# Patient Record
Sex: FEMALE | Race: BLACK OR AFRICAN AMERICAN | NOT HISPANIC OR LATINO
[De-identification: names, ages, dates, MRNs, and addresses within clinical notes are randomized per-mention and may not be internally consistent; named-entity substitution may affect disease eponyms.]

---

## 2019-09-16 VITALS
SYSTOLIC BLOOD PRESSURE: 133 MMHG | HEART RATE: 72 BPM | WEIGHT: 223.11 LBS | TEMPERATURE: 98 F | OXYGEN SATURATION: 98 % | HEIGHT: 64 IN | DIASTOLIC BLOOD PRESSURE: 88 MMHG | RESPIRATION RATE: 18 BRPM

## 2019-09-16 PROCEDURE — 71045 X-RAY EXAM CHEST 1 VIEW: CPT | Mod: 26

## 2019-09-16 RX ORDER — LIDOCAINE 4 G/100G
1 CREAM TOPICAL ONCE
Refills: 0 | Status: COMPLETED | OUTPATIENT
Start: 2019-09-16 | End: 2019-09-16

## 2019-09-16 NOTE — ED PROVIDER NOTE - PROGRESS NOTE DETAILS
pt states she slipped and fell in restroom c/o of left patellar and knee pain-  no ankle tenderness or deformity noted 2+/  DP /PT pulses palpated CR < 2 sec

## 2019-09-16 NOTE — ED ADULT NURSE NOTE - NSIMPLEMENTINTERV_GEN_ALL_ED
Implemented All Universal Safety Interventions:  [place] to call system. Call bell, personal items and telephone within reach. Instruct patient to call for assistance. Room bathroom lighting operational. Non-slip footwear when patient is off stretcher. Physically safe environment: no spills, clutter or unnecessary equipment. Stretcher in lowest position, wheels locked, appropriate side rails in place.

## 2019-09-16 NOTE — ED ADULT NURSE NOTE - OBJECTIVE STATEMENT
Pt presents complaining of chest pain, palpitations, and lightheadedness occurring suddenly this evening. Pt reports she has had 4 episodes of palpitations over the Pt presents complaining of chest pain, palpitations, and lightheadedness occurring suddenly this evening. Pt reports she has had 4 episodes of palpitations over the course of the past month, states she was worked up at a hospital in [place] last week and discharged without a diagnosis. Pt reports taking 3 baby aspirin pta, states after taking the aspirin all of her symptoms resolved. Pt reports hx of PE in 2011, currently taking lovenox.

## 2019-09-16 NOTE — ED PROVIDER NOTE - CLINICAL SUMMARY MEDICAL DECISION MAKING FREE TEXT BOX
49 yo F ex smoker x 2 years quit 2 years ago hx of PE  2011- on Lovenox  x 8 years 150 mg a day pain- no exertional CP  but failed a stress test in 2011 per pt  hx - no ischemic changes  pt "adopted"  but no fam hx of CAD or early MI  that she is aware of 47 yo F ex smoker x 2 years quit 2 years ago hx of PE  2011- on Lovenox  x 8 years 150 mg a day pain- no exertional CP  but failed a stress test in 2011 per pt  hx - no ischemic changes  pt "adopted"  but no fam hx of CAD or early MI  that she is aware of  failed a stress test 2 years ago  rec if CTA  neg to admit under cards - dr baptiste  dw  fellow 49 yo F ex smoker x 2 years quit 2 years ago hx of PE  2011- on Lovenox  x 8 years 150 mg a day pain- no exertional CP  but failed a stress test in 2 years ago  - no ischemic changes on EKG-  pt "adopted"  but no fam hx of CAD or early MI -  rec if CTA  neg to admit under cards - dr baptiste  dw  fellow

## 2019-09-16 NOTE — ED ADULT NURSE NOTE - CHPI ED NUR SYMPTOMS NEG
no shortness of breath/no nausea/no diaphoresis/no chills/no vomiting/no fever/no back pain/no syncope/no congestion

## 2019-09-16 NOTE — ED PROVIDER NOTE - DIAGNOSTIC INTERPRETATION
Xray left knee-3 views - no fx/  no effusion    Xray left tibia fib 2 views   no fx  noted     CXR- nl bones and soft tissues - nl mediastinum-  no ptx - no consolidation

## 2019-09-16 NOTE — ED PROVIDER NOTE - CARE PLAN
Principal Discharge DX:	Palpitations  Secondary Diagnosis:	Chest pain Principal Discharge DX:	Palpitations  Secondary Diagnosis:	Chest pain  Secondary Diagnosis:	Fall  Secondary Diagnosis:	Knee contusion

## 2019-09-16 NOTE — ED ADULT NURSE REASSESSMENT NOTE - NS ED NURSE REASSESS COMMENT FT1
Pt fell while using bathroom. Pt states "there was water on the floor and I slipped in the puddle." Pt elicits tenderness to her left patella. Pt endorses no lightheadedness, no dizziness at the time of the fall. Pt reports no head impact. MD Marie made aware, LONA Marshall made aware, pt given ice pack for comfort.

## 2019-09-16 NOTE — ED PROVIDER NOTE - SHIFT CHANGE DETAILS
await CTA to eval for PE -- if neg for large PE, rec admit cards -- cards fellow aware [facility] well

## 2019-09-16 NOTE — ED PROVIDER NOTE - OBJECTIVE STATEMENT
47 yo F with hx of PE 2011-  on Lovenox-  with palpitations over the past 6-8 hrs - no cough or fevers or chills -  no calf tenderness or leg swelling or edema no hx of DVT or PE ex smoker 1 ppd  quit 2 years ago  no hx of DVT /PE 47 yo F with hx of PE 2011-  on Lovenox-  with palpitations over the past 6-8 hrs - no cough or fevers or chills -  no calf tenderness or leg swelling or edema no hx of DVT or PE ex smoker 1 ppd  quit 2 years ago  no hx of DVT /PE  c/op of palpitations and tight ness in chest x 2 days - non exertional  in nature   states she had a stress test a few years ago that se apparently did not " pass"  denies any exertional CP 47 yo F with hx of PE 2011-  on Lovenox-  with palpitations over the past 6-8 hrs - no cough or fevers or chills -  no calf tenderness or leg swelling or edema no hx of DVT or PE ex smoker 1 ppd  quit 2 years ago  no hx of DVT /PE  c/op of palpitations and tightness in chest x 2 days  non exertional  in nature   states she had a stress test a few years ago that he apparently did not " pass" - denies any obvious  exertional CP 47 yo F with hx of PE 2011-  on Lovenox-  with palpitations over the past 6-8 hrs - no cough or fevers or chills - no calf tenderness or leg swelling or edema -no hx of DVT-  ex smoker 1 ppd  quit 2 years ago -also c/o of palpitations and tightness in chest x 2 days-  non exertional  in nature -  states she had a stress test a few years ago that he apparently did not " pass" - denies any obvious  exertional CP

## 2019-09-17 ENCOUNTER — TRANSCRIPTION ENCOUNTER (OUTPATIENT)
Age: 49
End: 2019-09-17

## 2019-09-17 ENCOUNTER — INPATIENT (INPATIENT)
Facility: HOSPITAL | Age: 49
LOS: 0 days | Discharge: HOME | DRG: 313 | End: 2019-09-17
Attending: INTERNAL MEDICINE | Admitting: INTERNAL MEDICINE
Payer: MEDICARE

## 2019-09-17 VITALS
DIASTOLIC BLOOD PRESSURE: 90 MMHG | HEART RATE: 58 BPM | RESPIRATION RATE: 18 BRPM | OXYGEN SATURATION: 95 % | SYSTOLIC BLOOD PRESSURE: 133 MMHG

## 2019-09-17 DIAGNOSIS — S80.00XA CONTUSION OF UNSPECIFIED KNEE, INITIAL ENCOUNTER: ICD-10-CM

## 2019-09-17 DIAGNOSIS — I26.99 OTHER PULMONARY EMBOLISM WITHOUT ACUTE COR PULMONALE: ICD-10-CM

## 2019-09-17 DIAGNOSIS — R07.9 CHEST PAIN, UNSPECIFIED: ICD-10-CM

## 2019-09-17 DIAGNOSIS — E11.9 TYPE 2 DIABETES MELLITUS WITHOUT COMPLICATIONS: ICD-10-CM

## 2019-09-17 DIAGNOSIS — Z98.891 HISTORY OF UTERINE SCAR FROM PREVIOUS SURGERY: Chronic | ICD-10-CM

## 2019-09-17 LAB
ANION GAP SERPL CALC-SCNC: 11 MMOL/L — SIGNIFICANT CHANGE UP (ref 5–17)
BUN SERPL-MCNC: 11 MG/DL — SIGNIFICANT CHANGE UP (ref 7–23)
CALCIUM SERPL-MCNC: 8.7 MG/DL — SIGNIFICANT CHANGE UP (ref 8.4–10.5)
CHLORIDE SERPL-SCNC: 108 MMOL/L — SIGNIFICANT CHANGE UP (ref 96–108)
CO2 SERPL-SCNC: 24 MMOL/L — SIGNIFICANT CHANGE UP (ref 22–31)
CREAT SERPL-MCNC: 0.98 MG/DL — SIGNIFICANT CHANGE UP (ref 0.5–1.3)
D DIMER BLD IA.RAPID-MCNC: 302 NG/ML DDU — HIGH
EXTRA BLUE TOP TUBE: SIGNIFICANT CHANGE UP
EXTRA SST TUBE: SIGNIFICANT CHANGE UP
EXTRA SST TUBE: SIGNIFICANT CHANGE UP
GLUCOSE BLDC GLUCOMTR-MCNC: 100 MG/DL — HIGH (ref 70–99)
GLUCOSE BLDC GLUCOMTR-MCNC: 94 MG/DL — SIGNIFICANT CHANGE UP (ref 70–99)
GLUCOSE BLDC GLUCOMTR-MCNC: 98 MG/DL — SIGNIFICANT CHANGE UP (ref 70–99)
GLUCOSE SERPL-MCNC: 105 MG/DL — HIGH (ref 70–99)
HCT VFR BLD CALC: 41.1 % — SIGNIFICANT CHANGE UP (ref 34.5–45)
HGB BLD-MCNC: 13.2 G/DL — SIGNIFICANT CHANGE UP (ref 11.5–15.5)
MCHC RBC-ENTMCNC: 28.9 PG — SIGNIFICANT CHANGE UP (ref 27–34)
MCHC RBC-ENTMCNC: 32.1 GM/DL — SIGNIFICANT CHANGE UP (ref 32–36)
MCV RBC AUTO: 89.9 FL — SIGNIFICANT CHANGE UP (ref 80–100)
NRBC # BLD: 0 /100 WBCS — SIGNIFICANT CHANGE UP (ref 0–0)
PLATELET # BLD AUTO: 213 K/UL — SIGNIFICANT CHANGE UP (ref 150–400)
POTASSIUM SERPL-MCNC: 4 MMOL/L — SIGNIFICANT CHANGE UP (ref 3.5–5.3)
POTASSIUM SERPL-SCNC: 4 MMOL/L — SIGNIFICANT CHANGE UP (ref 3.5–5.3)
RBC # BLD: 4.57 M/UL — SIGNIFICANT CHANGE UP (ref 3.8–5.2)
RBC # FLD: 14.7 % — HIGH (ref 10.3–14.5)
SODIUM SERPL-SCNC: 143 MMOL/L — SIGNIFICANT CHANGE UP (ref 135–145)
TROPONIN T SERPL-MCNC: <0.01 NG/ML — SIGNIFICANT CHANGE UP (ref 0–0.01)
WBC # BLD: 4.65 K/UL — SIGNIFICANT CHANGE UP (ref 3.8–10.5)
WBC # FLD AUTO: 4.65 K/UL — SIGNIFICANT CHANGE UP (ref 3.8–10.5)

## 2019-09-17 PROCEDURE — 99285 EMERGENCY DEPT VISIT HI MDM: CPT

## 2019-09-17 PROCEDURE — 75574 CT ANGIO HRT W/3D IMAGE: CPT | Mod: 26

## 2019-09-17 PROCEDURE — 99283 EMERGENCY DEPT VISIT LOW MDM: CPT | Mod: 25

## 2019-09-17 PROCEDURE — 73590 X-RAY EXAM OF LOWER LEG: CPT | Mod: 26,LT

## 2019-09-17 PROCEDURE — 93306 TTE W/DOPPLER COMPLETE: CPT | Mod: 26

## 2019-09-17 PROCEDURE — 71275 CT ANGIOGRAPHY CHEST: CPT | Mod: 26

## 2019-09-17 PROCEDURE — 93970 EXTREMITY STUDY: CPT | Mod: 26

## 2019-09-17 PROCEDURE — 93010 ELECTROCARDIOGRAM REPORT: CPT

## 2019-09-17 PROCEDURE — 73562 X-RAY EXAM OF KNEE 3: CPT | Mod: 26,LT

## 2019-09-17 PROCEDURE — 78582 LUNG VENTILAT&PERFUS IMAGING: CPT | Mod: 26

## 2019-09-17 RX ORDER — OMEPRAZOLE 10 MG/1
1 CAPSULE, DELAYED RELEASE ORAL
Qty: 0 | Refills: 0 | DISCHARGE

## 2019-09-17 RX ORDER — CYCLOBENZAPRINE HYDROCHLORIDE 10 MG/1
0 TABLET, FILM COATED ORAL
Qty: 0 | Refills: 0 | DISCHARGE

## 2019-09-17 RX ORDER — GLUCAGON INJECTION, SOLUTION 0.5 MG/.1ML
1 INJECTION, SOLUTION SUBCUTANEOUS ONCE
Refills: 0 | Status: DISCONTINUED | OUTPATIENT
Start: 2019-09-17 | End: 2019-09-17

## 2019-09-17 RX ORDER — ENOXAPARIN SODIUM 100 MG/ML
150 INJECTION SUBCUTANEOUS EVERY 24 HOURS
Refills: 0 | Status: DISCONTINUED | OUTPATIENT
Start: 2019-09-17 | End: 2019-09-17

## 2019-09-17 RX ORDER — DEXTROSE 50 % IN WATER 50 %
25 SYRINGE (ML) INTRAVENOUS ONCE
Refills: 0 | Status: DISCONTINUED | OUTPATIENT
Start: 2019-09-17 | End: 2019-09-17

## 2019-09-17 RX ORDER — LISINOPRIL 2.5 MG/1
1 TABLET ORAL
Qty: 0 | Refills: 0 | DISCHARGE

## 2019-09-17 RX ORDER — DEXTROSE 50 % IN WATER 50 %
15 SYRINGE (ML) INTRAVENOUS ONCE
Refills: 0 | Status: DISCONTINUED | OUTPATIENT
Start: 2019-09-17 | End: 2019-09-17

## 2019-09-17 RX ORDER — PANTOPRAZOLE SODIUM 20 MG/1
40 TABLET, DELAYED RELEASE ORAL
Refills: 0 | Status: DISCONTINUED | OUTPATIENT
Start: 2019-09-17 | End: 2019-09-17

## 2019-09-17 RX ORDER — METFORMIN HYDROCHLORIDE 850 MG/1
1 TABLET ORAL
Qty: 0 | Refills: 0 | DISCHARGE

## 2019-09-17 RX ORDER — LISINOPRIL 2.5 MG/1
20 TABLET ORAL
Refills: 0 | Status: DISCONTINUED | OUTPATIENT
Start: 2019-09-17 | End: 2019-09-17

## 2019-09-17 RX ORDER — SODIUM CHLORIDE 9 MG/ML
1000 INJECTION, SOLUTION INTRAVENOUS
Refills: 0 | Status: DISCONTINUED | OUTPATIENT
Start: 2019-09-17 | End: 2019-09-17

## 2019-09-17 RX ORDER — INSULIN LISPRO 100/ML
VIAL (ML) SUBCUTANEOUS
Refills: 0 | Status: DISCONTINUED | OUTPATIENT
Start: 2019-09-17 | End: 2019-09-17

## 2019-09-17 RX ORDER — ACETAMINOPHEN 500 MG
650 TABLET ORAL ONCE
Refills: 0 | Status: COMPLETED | OUTPATIENT
Start: 2019-09-17 | End: 2019-09-17

## 2019-09-17 RX ORDER — ENOXAPARIN SODIUM 100 MG/ML
0 INJECTION SUBCUTANEOUS
Qty: 0 | Refills: 0 | DISCHARGE

## 2019-09-17 RX ORDER — DEXTROSE 50 % IN WATER 50 %
12.5 SYRINGE (ML) INTRAVENOUS ONCE
Refills: 0 | Status: DISCONTINUED | OUTPATIENT
Start: 2019-09-17 | End: 2019-09-17

## 2019-09-17 RX ORDER — METOPROLOL TARTRATE 50 MG
50 TABLET ORAL ONCE
Refills: 0 | Status: COMPLETED | OUTPATIENT
Start: 2019-09-17 | End: 2019-09-17

## 2019-09-17 RX ORDER — INSULIN LISPRO 100/ML
VIAL (ML) SUBCUTANEOUS AT BEDTIME
Refills: 0 | Status: DISCONTINUED | OUTPATIENT
Start: 2019-09-17 | End: 2019-09-17

## 2019-09-17 RX ADMIN — Medication 50 MILLIGRAM(S): at 14:00

## 2019-09-17 RX ADMIN — LIDOCAINE 1 PATCH: 4 CREAM TOPICAL at 07:57

## 2019-09-17 RX ADMIN — ENOXAPARIN SODIUM 150 MILLIGRAM(S): 100 INJECTION SUBCUTANEOUS at 14:01

## 2019-09-17 RX ADMIN — Medication 650 MILLIGRAM(S): at 18:41

## 2019-09-17 RX ADMIN — LIDOCAINE 1 PATCH: 4 CREAM TOPICAL at 00:20

## 2019-09-17 RX ADMIN — Medication 650 MILLIGRAM(S): at 18:36

## 2019-09-17 RX ADMIN — LIDOCAINE 1 PATCH: 4 CREAM TOPICAL at 14:51

## 2019-09-17 NOTE — DISCHARGE NOTE PROVIDER - HOSPITAL COURSE
47y/o obese female with PMHx of HTN, DM-II, b/l PE in 2011--on Lovenox, Asthma, who presented to the ER 9/17/19 with 2 days of intermittent mid-sternal non radiating crampy 5/10 chest pain which lasting a few minutes at a time. Chest pain is associated with SOB, nausea, 1 episode of vomiting, and palpitations. She was evaluated by her PMD day of admission and was found to have /110, HR 90. She was recommended to increase her Lisinopril from 20mg daily to q12 which she did, and came into the ED after this didn't relieve her sx. In the ER, /88, HR 72, R 18, T 98.3, O2 sat 98% RA. She fell in the bathroom and hurt her left knee, knee Xray negative for fracture, CTA has no large or central pulmonary embolism, unable to see small vessels.  She received lidocaine patch for her knee pain and admitted for further cardiac management. V/Q scan and Lower extremity US negative for PE and DVT respectively. CTA coronaries negative for any plaques, Ca score 0. VSS, labs reviewed and patient is stable for discharge as discussed with Dr. Whalen. Patient has been given appropriate discharge instructions, including medication regimen. Follow up with primary care or cardiologist within the next week. Any new medications have been sent via e-prescription to patient’s pharmacy.

## 2019-09-17 NOTE — H&P ADULT - HISTORY OF PRESENT ILLNESS
This is a 49y/o obese female with PMHx of HTN, DM-II, b/l PE in 2011--on Lovenox Asthma who presents to the ER with 2 days of on/off mid sternal, non radiating chest pain which is described as crampy, rated 5/10, lasting a few minutes and associated with SOB, nausea, 1 episode of vomiting, palpitations. She was evaluated by her PMD day of admission and was found to have /110, HR 90. She was recommended to increase her Lisinopril from 20mg daily to q12 which she did but no help with her symptoms so she came to the ER  In the ER, /88, HR 72, R 18, T 98.3, O2 sat 98% RA. She fell in the bathroom and hurt her left knee, knee Xray negative for fracture, CTA has no large or central pulmonary embolism. Lab significant for K 3.6, , MB 1.4, Troponin <0.01. She received lidocaine patch for her knee pain and admitted for further cardiac management

## 2019-09-17 NOTE — H&P ADULT - NSHPPHYSICALEXAM_GEN_ALL_CORE
Temp 98.6F, HR 70bpm, /80, RR 16, O2 sat 98% RA, Weight 200lbs, Height 5' 6"    T(C): 36.7 (09-17-19 @ 05:10), Max: 36.8 (09-16-19 @ 21:02)  HR: 71 (09-17-19 @ 05:10) (67 - 72)  BP: 107/73 (09-17-19 @ 05:10) (107/73 - 133/88)  RR: 18 (09-17-19 @ 05:10) (16 - 18)  SpO2: 99% (09-17-19 @ 05:10) (98% - 99%)  Wt(kg): --    Appearance: Normal	  HEENT:   Normal oral mucosa, PERRL, EOMI	  Neck: Supple, - JVD; No Carotid Bruit and 2+ pulses B/L  Cardiovascular: Normal S1 S2, No JVD, No murmurs  Respiratory: Lungs clear to auscultation, No Rales, Rhonchi, Wheezing	  Gastrointestinal:  Soft, Non-tender, + BS	  Skin: No rashes, No ecchymoses, No cyanosis  Extremities: Normal range of motion, No clubbing, cyanosis or edema  Vascular: Femoral pulses 2+ b/l without bruit, DP 1+ b/l, PT 1+ b/l  Neurologic: Non-focal  Psychiatry: A & O x 3, Mood & affect appropriate Temp 98.6F, HR 70bpm, /80, RR 16, O2 sat 98% RA, Weight 200lbs, Height 5' 6"    T(C): 36.7 (09-17-19 @ 05:10), Max: 36.8 (09-16-19 @ 21:02)  HR: 71 (09-17-19 @ 05:10) (67 - 72)  BP: 107/73 (09-17-19 @ 05:10) (107/73 - 133/88)  RR: 18 (09-17-19 @ 05:10) (16 - 18)  SpO2: 99% (09-17-19 @ 05:10) (98% - 99%)  Wt(kg): --    Appearance: Normal	  HEENT:   Normal oral mucosa, PERRL, EOMI	  Neck: Supple, - JVD; No Carotid Bruit and 2+ pulses B/L  Cardiovascular: Normal S1 S2, No JVD, No murmurs  Respiratory: Lungs clear to auscultation, No Rales, Rhonchi, Wheezing	  Gastrointestinal:  Soft, Non-tender, + BS	  Skin: No rashes, No ecchymoses, No cyanosis  Extremities: Normal range of motion, No clubbing, cyanosis or edema  Vascular: Femoral pulses 2+ b/l without bruit, DP 1+ b/l, PT 1+ b/l  Neurologic: Non-focal  Psychiatry: A & O x 3, Mood & affect appropriate  msk +from

## 2019-09-17 NOTE — DISCHARGE NOTE NURSING/CASE MANAGEMENT/SOCIAL WORK - NSDCPEWEB_GEN_ALL_CORE
[facility] for Tobacco Control website --- [email or web address] website --- [email or web address]

## 2019-09-17 NOTE — DISCHARGE NOTE NURSING/CASE MANAGEMENT/SOCIAL WORK - PATIENT PORTAL LINK FT
You can access the FollowMyHealth Patient Portal offered by [facility] by registering at the following website: [email or web address]. By joining [facility]’s FollowMyHealth portal, you will also be able to view your health information using other applications (apps) compatible with our system.

## 2019-09-17 NOTE — DISCHARGE NOTE PROVIDER - NSDCCPCAREPLAN_GEN_ALL_CORE_FT
PRINCIPAL DISCHARGE DIAGNOSIS  Diagnosis: Chest pain  Assessment and Plan of Treatment: You were admitted to rule out heart attack and PE as causes of chest pain. You had a CTA chest, CTA coronaries, negative blood tests, and VQ scans which showed that you did not have PE or heart attack. There are several causes of chest pain, including acid reflux (heartburn) and musculoskeletal. These other causes can be worked up outpatient. You can follow up with your primary care doctor for further workup of chest pain.      SECONDARY DISCHARGE DIAGNOSES  Diagnosis: Hypertension  Assessment and Plan of Treatment: Your blood pressure was high when you came into the hospital. Start a low salt diet (<2 g of sodium per day). Continue lisinopril 20 mg twice daily. Folllow up with your primary care doctor or cardiologist for further blood pressure workup and medication adjustment. PRINCIPAL DISCHARGE DIAGNOSIS  Diagnosis: Chest pain  Assessment and Plan of Treatment: You were admitted to rule out heart attack and PE as causes of chest pain. You had a CTA chest, CTA coronaries, negative blood tests, and VQ scans which showed that you did not have PE or heart attack. There are several causes of chest pain, including acid reflux (heartburn) and musculoskeletal. These other causes can be worked up outpatient. You can follow up with your primary care doctor for further workup of chest pain.      SECONDARY DISCHARGE DIAGNOSES  Diagnosis: Hypertension  Assessment and Plan of Treatment: Your blood pressure was high when you came into the hospital. Start a low salt diet (<2 g of sodium per day). Continue lisinopril 20 mg twice daily. Folllow up with your primary care doctor or cardiologist for further blood pressure workup and medication adjustment.    Diagnosis: Pre-diabetes  Assessment and Plan of Treatment: HOLD METFORMIN FOR TWO DAYS. You got IV contrast dye with your CT scans today and yesterday and this can interact with metformin and your kidneys. RESTART METFORMIN ON FRIDAY 9/20/19.

## 2019-09-17 NOTE — H&P ADULT - ATTENDING COMMENTS
Assessment: Patient personally seen and examined myself during rounds with the   Physician Assistant  ON DATE 9/17/19    Note read, including vitals, physical findings, laboratory data, and radiological reports.   Agree with above and revisions, if any, included below.  - New acs-unstable angina  - Severe chest pain  - Unstable hemodynamics  - Plan of Care: continuous telemetry monitoring for arrhythmias, echocardiogram to evaluate ejection fraction and central pressures, daily weights and I/Os, serial EKGs and lab work to evaluate and replete potassium, magnesium. Troponin added to labs and will cycle. Will need to be seen by EP or Interv Cardio if intervention needed.

## 2019-09-17 NOTE — H&P ADULT - NSHPLABSRESULTS_GEN_ALL_CORE
Labs                        13.9   6.98  )-----------( 246      ( 16 Sep 2019 21:42 )             43.4       09-16    142  |  106  |  12  ----------------------------<  138<H>  3.6   |  25  |  0.93    Ca    9.4      16 Sep 2019 21:42    TPro  7.8  /  Alb  4.2  /  TBili  0.3  /  DBili  x   /  AST  28  /  ALT  34  /  AlkPhos  58  09-16      PT/INR - ( 16 Sep 2019 21:42 )   PT: 11.1 sec;   INR: 0.98          PTT - ( 16 Sep 2019 21:42 )  PTT:32.4 sec    CARDIAC MARKERS ( 16 Sep 2019 21:42 )  x     / <0.01 ng/mL / 216 U/L / x     / 1.4 ng/mL

## 2019-09-17 NOTE — H&P ADULT - NSICDXPASTMEDICALHX_GEN_ALL_CORE_FT
PAST MEDICAL HISTORY:  Asthma     Diabetes     Hypertension     Pulmonary embolism     S/P hysterectomy

## 2019-09-17 NOTE — H&P ADULT - NSHPREVIEWOFSYSTEMS_GEN_ALL_CORE
GENERAL, CONSTITUTIONAL : denies recent weight loss, fever, chills  EYES, VISION: denies changes in vision   EARS, NOSE, THROAT: denies hearing loss  HEART, CARDIOVASCULAR: +chest pain, palpitations, SOB; denies arrhythmia, LE edema, claudication  RESPIRATORY: +SOB; Denies cough, wheezing, PND, orthopnea  GASTROINTESTINAL: Denies abdominal pain, heartburn, bloody stool, dark tarry stool  GENITOURINARY: Denies frequent urination, urgency  MUSCULOSKELETAL: +joint pain; denies swelling, restricted motion, musculoskeletal pain.   SKIN & INTEGUMENTARY Denies rashes, sores, blisters, blisters, growths.  NEUROLOGICAL: Denies numbness or tingling sensations, sensation loss, burning.   PSYCHIATRIC: Denies nervousness, anxiety, depression  ENDOCRINE Denies heat or cold intolerance, excessive thirst  HEMATOLOGIC/LYMPHATIC: Denies abnormal bleeding, bleeding of any kind

## 2019-09-17 NOTE — DISCHARGE NOTE NURSING/CASE MANAGEMENT/SOCIAL WORK - NSDCPEEMAIL_GEN_ALL_CORE
[facility] for Tobacco Control email [email or web address]

## 2019-09-17 NOTE — DISCHARGE NOTE PROVIDER - NSDCFUADDAPPT_GEN_ALL_CORE_FT
Follow up with primary care or cardiology in the next week. Follow up with primary care doctor, Dr. Flores in the next week.

## 2019-09-19 DIAGNOSIS — Z88.5 ALLERGY STATUS TO NARCOTIC AGENT: ICD-10-CM
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
## 2019-09-19 DIAGNOSIS — J45.909 UNSPECIFIED ASTHMA, UNCOMPLICATED: ICD-10-CM
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
## 2019-09-19 DIAGNOSIS — Z79.01 LONG TERM (CURRENT) USE OF ANTICOAGULANTS: ICD-10-CM

## 2019-09-19 DIAGNOSIS — R07.9 CHEST PAIN, UNSPECIFIED: ICD-10-CM

## 2019-09-19 DIAGNOSIS — Z87.891 PERSONAL HISTORY OF NICOTINE DEPENDENCE: ICD-10-CM

## 2019-09-19 DIAGNOSIS — I10 ESSENTIAL (PRIMARY) HYPERTENSION: ICD-10-CM

## 2019-09-19 DIAGNOSIS — Z79.84 LONG TERM (CURRENT) USE OF ORAL HYPOGLYCEMIC DRUGS: ICD-10-CM

## 2019-09-19 DIAGNOSIS — E11.9 TYPE 2 DIABETES MELLITUS WITHOUT COMPLICATIONS: ICD-10-CM

## 2019-09-19 DIAGNOSIS — S80.02XA CONTUSION OF LEFT KNEE, INITIAL ENCOUNTER: ICD-10-CM

## 2019-09-19 DIAGNOSIS — Z86.711 PERSONAL HISTORY OF PULMONARY EMBOLISM: ICD-10-CM

## 2019-10-13 PROCEDURE — 93005 ELECTROCARDIOGRAM TRACING: CPT

## 2019-10-13 PROCEDURE — 78582 LUNG VENTILAT&PERFUS IMAGING: CPT

## 2019-10-13 PROCEDURE — 71045 X-RAY EXAM CHEST 1 VIEW: CPT

## 2019-10-13 PROCEDURE — 84484 ASSAY OF TROPONIN QUANT: CPT

## 2019-10-13 PROCEDURE — 85025 COMPLETE CBC W/AUTO DIFF WBC: CPT

## 2019-10-13 PROCEDURE — 82962 GLUCOSE BLOOD TEST: CPT

## 2019-10-13 PROCEDURE — 82550 ASSAY OF CK (CPK): CPT

## 2019-10-13 PROCEDURE — 85610 PROTHROMBIN TIME: CPT

## 2019-10-13 PROCEDURE — 36415 COLL VENOUS BLD VENIPUNCTURE: CPT

## 2019-10-13 PROCEDURE — 93970 EXTREMITY STUDY: CPT

## 2019-10-13 PROCEDURE — 82553 CREATINE MB FRACTION: CPT

## 2019-10-13 PROCEDURE — 71275 CT ANGIOGRAPHY CHEST: CPT

## 2019-10-13 PROCEDURE — 80053 COMPREHEN METABOLIC PANEL: CPT

## 2019-10-13 PROCEDURE — 99285 EMERGENCY DEPT VISIT HI MDM: CPT | Mod: 25

## 2019-10-13 PROCEDURE — A9567: CPT

## 2019-10-13 PROCEDURE — 73590 X-RAY EXAM OF LOWER LEG: CPT

## 2019-10-13 PROCEDURE — 85379 FIBRIN DEGRADATION QUANT: CPT

## 2019-10-13 PROCEDURE — 73562 X-RAY EXAM OF KNEE 3: CPT

## 2019-10-13 PROCEDURE — A9540: CPT

## 2019-10-13 PROCEDURE — 85027 COMPLETE CBC AUTOMATED: CPT

## 2019-10-13 PROCEDURE — 85730 THROMBOPLASTIN TIME PARTIAL: CPT

## 2019-10-13 PROCEDURE — 75574 CT ANGIO HRT W/3D IMAGE: CPT

## 2019-10-13 PROCEDURE — 80048 BASIC METABOLIC PNL TOTAL CA: CPT

## 2019-10-13 PROCEDURE — 93306 TTE W/DOPPLER COMPLETE: CPT

## 2022-02-14 NOTE — ED PROVIDER NOTE - NS ED MD SHIFT CHANGE PENDING ITEMS
Medical Necessity Clause: This procedure was medically necessary because the lesions that were treated were: CT/MRI results

## 2024-06-22 NOTE — ED ADULT NURSE NOTE - TEMPLATE
Sent my chart message. Please ensure she has read. Please check on her. If not significantly  better, I would like to order ct.   Needs 1 week follow up with me. Thanks!  Dede    Ms. Mathews-  The labs show a lower sodium level. There is also blood and bacteria in the urine.   Make sure you are drinking one gatorade daily and if you develop vomiting, increased pain, fevers, go to the ER.   I will be in touch once I get the urine culture back.   How are you feeling?   I will have the office call and reschedule follow up in one week.   Please let us know if you develop  any new or concerning symptoms.   RADHA Hill  [facility] Internal Medicine     Cardiac

## 2025-01-27 NOTE — PATIENT PROFILE ADULT - NSPROGENSOURCEINFO_GEN_A_NUR
Discharge Summary/Instructions after an Endoscopic Procedure  Patient Name: Cheryl Bauer  Patient MRN: [id number]  Patient YOB: 1966 Monday, January 27, 2025  Lacy Miles MD  Dear patient,  As a result of recent federal legislation (The Federal Cures Act), you may   receive lab or pathology results from your procedure in your [facility]   account before your physician is able to contact you. Your physician or   their representative will relay the results to you with their   recommendations at their soonest availability.  Thank you,  RESTRICTIONS:  During your procedure today, you received medications for sedation.  These   medications may affect your judgment, balance and coordination.  Therefore,   for 24 hours, you have the following restrictions:   - DO NOT drive a car, operate machinery, make legal/financial decisions,   sign important papers or drink alcohol.    ACTIVITY:  Today: no heavy lifting, straining or running due to procedural   sedation/anesthesia.  The following day: return to full activity including work.  DIET:  Eat and drink normally unless instructed otherwise.     TREATMENT FOR COMMON SIDE EFFECTS:  - Mild abdominal pain, nausea, belching, bloating or excessive gas:  rest,   eat lightly and use a heating pad.  - Sore Throat: treat with throat lozenges and/or gargle with warm salt   water.  - Because air was used during the procedure, expelling large amounts of air   from your rectum or belching is normal.  - If a bowel prep was taken, you may not have a bowel movement for 1-3 days.    This is normal.  SYMPTOMS TO WATCH FOR AND REPORT TO YOUR PHYSICIAN:  1. Abdominal pain or bloating, other than gas cramps.  2. Chest pain.  3. Back pain.  4. Signs of infection such as: chills or fever occurring within 24 hours   after the procedure.  5. Rectal bleeding, which would show as bright red, maroon, or black stools.   (A tablespoon of blood from the rectum is not serious, especially  if   hemorrhoids are present.)  6. Vomiting.  7. Weakness or dizziness.  GO DIRECTLY TO THE NEAREST EMERGENCY ROOM IF YOU HAVE ANY OF THE FOLLOWING:      Difficulty breathing              Chills and/or fever over 101 F   Persistent vomiting and/or vomiting blood   Severe abdominal pain   Severe chest pain   Black, tarry stools   Bleeding- more than one tablespoon   Any other symptom or condition that you feel may need urgent attention  Your doctor recommends these additional instructions:  If any biopsies were taken, your doctors clinic will contact you in 1 to 2   weeks with any results.  - Patient has a contact number available for emergencies.  The signs and   symptoms of potential delayed complications were discussed with the   patient.  Return to normal activities tomorrow.  Written discharge   instructions were provided to the patient.   - Discharge patient to home.   - Resume previous diet.   - Continue present medications.   - Await pathology results.   - Repeat colonoscopy in 5 years for surveillance.  For questions, problems or results please call your physician - Lacy Miles MD at Work:  [phone], Work:  [phone].  [facility] , EMERGENCY ROOM PHONE NUMBER: [phone]  IF A COMPLICATION OR EMERGENCY SITUATION ARISES AND YOU ARE UNABLE TO REACH   YOUR PHYSICIAN - GO DIRECTLY TO THE EMERGENCY ROOM.  Lacy Miles MD  1/27/2025 1:41:29 PM  This report has been verified and signed electronically.  Dear patient,  As a result of recent federal legislation (The Federal Cures Act), you may   receive lab or pathology results from your procedure in your [facility]   account before your physician is able to contact you. Your physician or   their representative will relay the results to you with their   recommendations at their soonest availability.  Thank you,  [employer]   patient